# Patient Record
Sex: FEMALE
[De-identification: names, ages, dates, MRNs, and addresses within clinical notes are randomized per-mention and may not be internally consistent; named-entity substitution may affect disease eponyms.]

---

## 2020-01-10 ENCOUNTER — RX ONLY (RX ONLY)
Age: 50
End: 2020-01-10

## 2021-01-10 ENCOUNTER — RX ONLY (RX ONLY)
Age: 51
End: 2021-01-10

## 2022-01-10 ENCOUNTER — RX ONLY (RX ONLY)
Age: 52
End: 2022-01-10

## 2023-03-30 ENCOUNTER — APPOINTMENT (RX ONLY)
Dept: URBAN - METROPOLITAN AREA CLINIC 93 | Facility: CLINIC | Age: 53
Setting detail: DERMATOLOGY
End: 2023-03-30

## 2023-03-30 DIAGNOSIS — L71.8 OTHER ROSACEA: ICD-10-CM | Status: INADEQUATELY CONTROLLED

## 2023-03-30 PROCEDURE — 99204 OFFICE O/P NEW MOD 45 MIN: CPT

## 2023-03-30 PROCEDURE — ? TREATMENT REGIMEN

## 2023-03-30 PROCEDURE — ? PRESCRIPTION

## 2023-03-30 PROCEDURE — ? COUNSELING

## 2023-03-30 RX ORDER — DOXYCYCLINE 40 MG/1
CAPSULE ORAL QD
Qty: 30 | Refills: 3 | Status: ERX | COMMUNITY
Start: 2023-03-30

## 2023-03-30 RX ORDER — OXYMETAZOLINE HYDROCHLORIDE 1 G/100G
CREAM TOPICAL
Qty: 30 | Refills: 3 | Status: ERX | COMMUNITY
Start: 2023-03-30

## 2023-03-30 RX ADMIN — OXYMETAZOLINE HYDROCHLORIDE: 1 CREAM TOPICAL at 00:00

## 2023-03-30 RX ADMIN — DOXYCYCLINE: 40 CAPSULE ORAL at 00:00

## 2023-03-30 ASSESSMENT — LOCATION SIMPLE DESCRIPTION DERM
LOCATION SIMPLE: LEFT CHEEK
LOCATION SIMPLE: RIGHT CHEEK

## 2023-03-30 ASSESSMENT — LOCATION ZONE DERM: LOCATION ZONE: FACE

## 2023-03-30 ASSESSMENT — LOCATION DETAILED DESCRIPTION DERM
LOCATION DETAILED: LEFT INFERIOR CENTRAL MALAR CHEEK
LOCATION DETAILED: RIGHT INFERIOR CENTRAL MALAR CHEEK

## 2023-03-30 NOTE — PROCEDURE: MIPS QUALITY
Quality 431: Preventive Care And Screening: Unhealthy Alcohol Use - Screening: Patient not identified as an unhealthy alcohol user when screened for unhealthy alcohol use using a systematic screening method
Quality 110: Preventive Care And Screening: Influenza Immunization: Influenza Immunization Ordered or Recommended, but not Administered due to system reason
Quality 226: Preventive Care And Screening: Tobacco Use: Screening And Cessation Intervention: Patient screened for tobacco use and is an ex/non-smoker
Detail Level: Generalized

## 2023-03-30 NOTE — PROCEDURE: TREATMENT REGIMEN
Initiate Treatment: Oreacea 40mg daily, Rhofade QAM wait five minutes and apply AP Rosacea triple gel, use triple gel nightly also.
Detail Level: Detailed
Plan: RTC in 8-12 weeks
Otc Regimen: CeraVe gentle cleanser, moisturizer and sunscreen daily

## 2023-06-30 ENCOUNTER — APPOINTMENT (RX ONLY)
Dept: URBAN - METROPOLITAN AREA CLINIC 93 | Facility: CLINIC | Age: 53
Setting detail: DERMATOLOGY
End: 2023-06-30

## 2023-06-30 DIAGNOSIS — L71.8 OTHER ROSACEA: ICD-10-CM

## 2023-06-30 PROCEDURE — ? PRESCRIPTION

## 2023-06-30 PROCEDURE — ? COUNSELING

## 2023-06-30 PROCEDURE — ? TREATMENT REGIMEN

## 2023-06-30 PROCEDURE — 99214 OFFICE O/P EST MOD 30 MIN: CPT

## 2023-06-30 RX ORDER — OXYMETAZOLINE HYDROCHLORIDE 1 G/100G
CREAM TOPICAL
Qty: 30 | Refills: 5 | Status: ERX

## 2023-06-30 RX ORDER — DOXYCYCLINE 40 MG/1
CAPSULE ORAL QD
Qty: 30 | Refills: 11 | Status: ERX

## 2023-06-30 ASSESSMENT — LOCATION SIMPLE DESCRIPTION DERM
LOCATION SIMPLE: RIGHT CHEEK
LOCATION SIMPLE: LEFT CHEEK

## 2023-06-30 ASSESSMENT — LOCATION DETAILED DESCRIPTION DERM
LOCATION DETAILED: RIGHT INFERIOR CENTRAL MALAR CHEEK
LOCATION DETAILED: LEFT INFERIOR CENTRAL MALAR CHEEK

## 2023-06-30 ASSESSMENT — LOCATION ZONE DERM: LOCATION ZONE: FACE

## 2023-06-30 NOTE — PROCEDURE: TREATMENT REGIMEN
Initiate Treatment: Oreacea 40mg daily, Rhofade QAM wait five minutes and apply AP Rosacea triple gel, use triple gel nightly also.
Detail Level: Detailed
Plan: Continue regiment as previously directed. RTC in 8-12 weeks
Otc Regimen: CeraVe gentle cleanser, moisturizer and sunscreen daily

## 2024-01-05 ENCOUNTER — APPOINTMENT (RX ONLY)
Dept: URBAN - METROPOLITAN AREA CLINIC 93 | Facility: CLINIC | Age: 54
Setting detail: DERMATOLOGY
End: 2024-01-05

## 2024-01-05 DIAGNOSIS — L71.8 OTHER ROSACEA: ICD-10-CM

## 2024-01-05 PROCEDURE — ? TREATMENT REGIMEN

## 2024-01-05 PROCEDURE — ? PRESCRIPTION

## 2024-01-05 PROCEDURE — ? COUNSELING

## 2024-01-05 PROCEDURE — 99214 OFFICE O/P EST MOD 30 MIN: CPT

## 2024-01-05 RX ORDER — OXYMETAZOLINE HYDROCHLORIDE 1 G/100G
CREAM TOPICAL
Qty: 30 | Refills: 11 | Status: ERX | COMMUNITY
Start: 2024-01-05

## 2024-01-05 RX ADMIN — OXYMETAZOLINE HYDROCHLORIDE: 1 CREAM TOPICAL at 00:00

## 2024-01-05 ASSESSMENT — LOCATION ZONE DERM: LOCATION ZONE: FACE

## 2024-01-05 ASSESSMENT — LOCATION SIMPLE DESCRIPTION DERM
LOCATION SIMPLE: RIGHT CHEEK
LOCATION SIMPLE: LEFT CHEEK

## 2024-01-05 NOTE — PROCEDURE: TREATMENT REGIMEN
Discontinue Regimen: Oracea due to cost and disliking of addition of oral medication
Detail Level: Detailed
Continue Regimen: Rhofade and AP Rosacea triple gel. May need to send generic of Rhofade if savings card expires.
Plan: Continue regiment as previously directed. Recommended laser treatment, pt declines at this time due to cost. Discussed the importance of SPF 30mL higher with reapplication.
Otc Regimen: CeraVe gentle cleanser, moisturizer and sunscreen daily

## 2025-01-10 ENCOUNTER — APPOINTMENT (OUTPATIENT)
Dept: URBAN - METROPOLITAN AREA CLINIC 93 | Facility: CLINIC | Age: 55
Setting detail: DERMATOLOGY
End: 2025-01-10

## 2025-01-10 DIAGNOSIS — L71.8 OTHER ROSACEA: ICD-10-CM | Status: IMPROVED

## 2025-01-10 PROCEDURE — ? PRESCRIPTION MEDICATION MANAGEMENT

## 2025-01-10 PROCEDURE — ? COUNSELING

## 2025-01-10 PROCEDURE — 99213 OFFICE O/P EST LOW 20 MIN: CPT

## 2025-01-10 PROCEDURE — ? PRESCRIPTION

## 2025-01-10 RX ORDER — OXYMETAZOLINE HYDROCHLORIDE 1 G/100G
APPLY CREAM TOPICAL BID
Qty: 30 | Refills: 7 | Status: ERX | COMMUNITY
Start: 2025-01-10

## 2025-01-10 RX ORDER — IVERMECTIN/METRONIDAZOLE/NIACI 1 %-1 %-4%
APPLY GEL (GRAM) TOPICAL BID
Qty: 30 | Refills: 5 | Status: ERX | COMMUNITY
Start: 2025-01-10

## 2025-01-10 RX ADMIN — Medication APPLY: at 00:00

## 2025-01-10 RX ADMIN — OXYMETAZOLINE HYDROCHLORIDE APPLY: 1 CREAM TOPICAL at 00:00

## 2025-01-10 ASSESSMENT — LOCATION SIMPLE DESCRIPTION DERM
LOCATION SIMPLE: LEFT CHEEK
LOCATION SIMPLE: RIGHT CHEEK
LOCATION SIMPLE: CHIN

## 2025-01-10 ASSESSMENT — LOCATION DETAILED DESCRIPTION DERM
LOCATION DETAILED: RIGHT INFERIOR CENTRAL MALAR CHEEK
LOCATION DETAILED: LEFT MENTAL CREASE
LOCATION DETAILED: LEFT INFERIOR CENTRAL MALAR CHEEK

## 2025-01-10 ASSESSMENT — LOCATION ZONE DERM: LOCATION ZONE: FACE

## 2025-01-10 NOTE — PROCEDURE: PRESCRIPTION MEDICATION MANAGEMENT
Detail Level: Simple
Plan: Discussed with option of PDL laser. \\nRecommended humidifier and ceramide cream, sun flower oil, fish oil, unrefined organic coconut oil.
Render In Strict Bullet Format?: No
Continue Regimen: Continue the following treatment(s): Rhofade - apply to affected areas twice daily\\n\\nAveidaoxia- 4% niacinamide, 5% potassium azeloyl digly (bulk), 1% ivermectin, 1% metronidazole- Apply to affected areas of face twice daily

## 2025-01-13 RX ORDER — OXYMETAZOLINE HYDROCHLORIDE 1 G/100G
APPLY CREAM TOPICAL BID
Qty: 30 | Refills: 7 | Status: ERX | COMMUNITY
Start: 2025-01-13

## 2025-01-13 RX ADMIN — OXYMETAZOLINE HYDROCHLORIDE APPLY: 1 CREAM TOPICAL at 00:00
